# Patient Record
Sex: MALE | Race: WHITE | NOT HISPANIC OR LATINO | Employment: FULL TIME | URBAN - NONMETROPOLITAN AREA
[De-identification: names, ages, dates, MRNs, and addresses within clinical notes are randomized per-mention and may not be internally consistent; named-entity substitution may affect disease eponyms.]

---

## 2018-12-20 ENCOUNTER — OFFICE VISIT (OUTPATIENT)
Dept: MEDICAL GROUP | Facility: CLINIC | Age: 33
End: 2018-12-20

## 2018-12-20 VITALS
HEART RATE: 91 BPM | HEIGHT: 71 IN | WEIGHT: 251 LBS | SYSTOLIC BLOOD PRESSURE: 173 MMHG | DIASTOLIC BLOOD PRESSURE: 111 MMHG | TEMPERATURE: 98.5 F | BODY MASS INDEX: 35.14 KG/M2 | RESPIRATION RATE: 16 BRPM | OXYGEN SATURATION: 96 %

## 2018-12-20 DIAGNOSIS — E66.9 OBESITY (BMI 35.0-39.9 WITHOUT COMORBIDITY): ICD-10-CM

## 2018-12-20 DIAGNOSIS — S05.91XA RIGHT EYE INJURY, INITIAL ENCOUNTER: ICD-10-CM

## 2018-12-20 DIAGNOSIS — I10 ESSENTIAL HYPERTENSION: ICD-10-CM

## 2018-12-20 PROCEDURE — 99202 OFFICE O/P NEW SF 15 MIN: CPT | Performed by: PHYSICIAN ASSISTANT

## 2018-12-20 RX ORDER — ERYTHROMYCIN 5 MG/G
OINTMENT OPHTHALMIC
Qty: 1 TUBE | Refills: 0 | Status: SHIPPED | OUTPATIENT
Start: 2018-12-20 | End: 2019-01-17

## 2018-12-20 RX ORDER — HYDROCHLOROTHIAZIDE 25 MG/1
25 TABLET ORAL DAILY
Qty: 30 TAB | Refills: 3 | Status: SHIPPED | OUTPATIENT
Start: 2018-12-20 | End: 2019-01-17

## 2018-12-20 NOTE — PROGRESS NOTES
"cc:  Eye injury    Subjective:     Diego Hunt is a 33 y.o. male presenting for  Right eye injury    Patient woke up this morning at 5 am with pain in the right eye.  He states that the corner of a blanket caught his right eye.  His eye has been watering. Earlier today the pain was severe but now the pain is mild.  He has tried warm compresses and cold compresses which have not worked.  He denies fever and chills, nausea and vomiting.  He has had cold symptoms for about 4 days and had stiff neck for awhile.      Patient also has a history of pre-hypertension.  Indicates that when he is gone to the grocery store and checked his blood pressure, it has been mildly elevated.  He also acknowledges that he has been ill but his blood pressure today is 173/111.      Review of systems:  See above.  Denies any other symptoms unless previously indicated      Current Outpatient Prescriptions:   •  erythromycin 5 MG/GM Ointment, Apply 1 cm ribbon in right eye up to 6 times a day for 7 to 10 day, Disp: 1 Tube, Rfl: 0  •  hydroCHLOROthiazide (HYDRODIURIL) 25 MG Tab, Take 1 Tab by mouth every day., Disp: 30 Tab, Rfl: 3    Allergies, past medical history, past surgical history, family history, social history reviewed and updated    Objective:     Vitals: BP (!) 173/111 (BP Location: Right arm, Patient Position: Sitting)   Pulse 91   Temp 36.9 °C (98.5 °F)   Resp 16   Ht 1.803 m (5' 11\")   Wt 113.9 kg (251 lb)   SpO2 96%   BMI 35.01 kg/m²    General: Alert, pleasant, NAD  HEENT: Normocephalic.  EOMI, no icterus or pallor.  Conjunctivae of right eye appears injected and inflamed.  Eye is watering.  Eyelashes of right eye are matted.  Lids normal. External ears normal.  Ear canals normal oropharynx non-erythematous, mucous membranes moist.  Neck supple.   No cervical or supraclavicular lymphadenopathy.  Heart: Regular rate and rhythm.  S1 and S2 normal.  No murmurs appreciated.  Respiratory: Normal respiratory effort. Wheezing " in upper and lower left lobe  Skin: Warm, dry, no rashes.  Face appears flushed.  Musculoskeletal: Gait is normal.  Moves all extremities well.  Neuro: Cranial nerves II through XII intact.  No focal deficits noted.  Psych:  Affect/mood is normal, judgement is good, memory is intact, grooming is appropriate.    Assessment/Plan:     Diego was seen today for eye problem.    Diagnoses and all orders for this visit:    Right eye injury, initial encounter  -     erythromycin 5 MG/GM Ointment; Apply 1 cm ribbon in right eye up to 6 times a day for 7 to 10 day    I have advised patient that as this is an eye injury, I do not have any equipment here or the tools necessary to thoroughly evaluate the eye to make sure there is not a foreign object or an ulceration.  I will start him on an antibiotic but I have advised him to go to an urgent care at least urgent care/ER to have his eye more fully evaluated.  Patient has acknowledged and verbalized understanding to these instructions.  Potential issues and side effects with undiagnosed issues could result in blindness and patient is aware of this.    Essential hypertension  -     hydroCHLOROthiazide (HYDRODIURIL) 25 MG Tab; Take 1 Tab by mouth every day.  -     Lipid Profile; Future  -     COMP METABOLIC PANEL; Future  Obesity (BMI 35.0-39.9 without comorbidity)    We will start patient on hydrochlorothiazide 25 mg and obtain labs.  We will follow-up in 1 month for blood pressure check and to discuss lab results.  Discussed improved diet and exercise.    No Follow-up on file.

## 2019-01-09 ENCOUNTER — NON-PROVIDER VISIT (OUTPATIENT)
Dept: MEDICAL GROUP | Facility: CLINIC | Age: 34
End: 2019-01-09
Payer: COMMERCIAL

## 2019-01-09 DIAGNOSIS — I10 ESSENTIAL HYPERTENSION: ICD-10-CM

## 2019-01-09 PROCEDURE — 36415 COLL VENOUS BLD VENIPUNCTURE: CPT | Performed by: INTERNAL MEDICINE

## 2019-01-09 NOTE — NON-PROVIDER
Diego Hunt is a 34 y.o. male here for a non-provider visit for Venipuncture    If abnormal was an in office provider notified upon receipt of results (if so, indicate provider)? Yes  Routed to PCP? Yes

## 2019-01-17 ENCOUNTER — OFFICE VISIT (OUTPATIENT)
Dept: MEDICAL GROUP | Facility: CLINIC | Age: 34
End: 2019-01-17

## 2019-01-17 VITALS
RESPIRATION RATE: 16 BRPM | SYSTOLIC BLOOD PRESSURE: 167 MMHG | HEIGHT: 71 IN | HEART RATE: 90 BPM | DIASTOLIC BLOOD PRESSURE: 104 MMHG | WEIGHT: 245 LBS | TEMPERATURE: 98.7 F | BODY MASS INDEX: 34.3 KG/M2 | OXYGEN SATURATION: 96 %

## 2019-01-17 DIAGNOSIS — E78.49 FAMILIAL HYPERLIPIDEMIA, HIGH LDL: ICD-10-CM

## 2019-01-17 DIAGNOSIS — E66.9 OBESITY (BMI 35.0-39.9 WITHOUT COMORBIDITY): ICD-10-CM

## 2019-01-17 DIAGNOSIS — I10 ESSENTIAL HYPERTENSION: ICD-10-CM

## 2019-01-17 PROBLEM — S05.91XA RIGHT EYE INJURY: Status: RESOLVED | Noted: 2018-12-20 | Resolved: 2019-01-17

## 2019-01-17 PROCEDURE — 99213 OFFICE O/P EST LOW 20 MIN: CPT | Performed by: PHYSICIAN ASSISTANT

## 2019-01-17 RX ORDER — HYDROCHLOROTHIAZIDE 50 MG/1
50 TABLET ORAL DAILY
Qty: 90 TAB | Refills: 1 | Status: SHIPPED | OUTPATIENT
Start: 2019-01-17 | End: 2019-03-20

## 2019-01-17 NOTE — PROGRESS NOTES
"cc:  hypertension    Subjective:     Diego Hunt is a 34 y.o. male presenting follow up blood pressure.        He states that he has been checking his blood pressure daily and states that it has been 135/95.  He states that overall he has been working on maintaining a healthy diet.  However he does notice at night that he is more hungry and will reach for anything he can find to eat including sugars and processed foods where he tries to avoid that most of the day.  He is wanting to know if there is something to be done to help him with the snacking.  Overall he feels that he is improved.  He is no longer having any eye issues.  He is here today to follow-up with his blood pressure and lab work.    Review of systems:  See above.  Denies any other symptoms unless previously indicated.      Current Outpatient Prescriptions:   •  hydroCHLOROthiazide (HYDRODIURIL) 50 MG Tab, Take 1 Tab by mouth every day., Disp: 90 Tab, Rfl: 1    Allergies, past medical history, past surgical history, family history, social history reviewed and updated    Objective:     Vitals: BP (!) 167/104 (BP Location: Left arm, Patient Position: Sitting)   Pulse 90   Temp 37.1 °C (98.7 °F)   Resp 16   Ht 1.803 m (5' 11\")   Wt 111.1 kg (245 lb)   SpO2 96%   BMI 34.17 kg/m²   General: Alert, pleasant, NAD  HEENT: Normocephalic.  EOMI, no icterus or pallor.  Conjunctivae and lids normal. External ears normal. Neck supple.  Heart: Regular rate and rhythm.  S1 and S2 normal.  No murmurs appreciated.  Respiratory: Normal respiratory effort.  Clear to auscultation bilaterally.  Skin: Warm, dry, no rashes.  Musculoskeletal: Gait is normal.  Moves all extremities well.  Neuro: Cranial nerves II through XII intact.  No focal deficits noted.  Psych:  Affect/mood is normal, judgement is good, memory is intact, grooming is appropriate.    Assessment/Plan:     Diego was seen today for follow-up.    Diagnoses and all orders for this visit:    Essential " hypertension  -     hydroCHLOROthiazide (HYDRODIURIL) 50 MG Tab; Take 1 Tab by mouth every day.  Familial hyperlipidemia, high LDL  Obesity (BMI 35.0-39.9 without comorbidity) (HCC)    Increase hydrochlorothiazide to 50 mg.  We will follow-up in 3-4 months.  If no improvement, will need to add medications.  Patient will work on improved diet and exercise.  We did discuss nighttime snacking and recommended trying to add protein instead of going for the fast burning carbs.  Patient indicates that he will give this a try.  We can repeat labs in a year and he will contact us sooner if needed.        Return in about 4 months (around 5/17/2019) for 3-4 months.

## 2019-03-20 ENCOUNTER — OFFICE VISIT (OUTPATIENT)
Dept: MEDICAL GROUP | Facility: CLINIC | Age: 34
End: 2019-03-20

## 2019-03-20 VITALS
BODY MASS INDEX: 35.28 KG/M2 | DIASTOLIC BLOOD PRESSURE: 110 MMHG | SYSTOLIC BLOOD PRESSURE: 162 MMHG | HEART RATE: 90 BPM | OXYGEN SATURATION: 98 % | WEIGHT: 252 LBS | HEIGHT: 71 IN | TEMPERATURE: 98.6 F

## 2019-03-20 DIAGNOSIS — F41.9 ANXIETY: ICD-10-CM

## 2019-03-20 DIAGNOSIS — I10 ESSENTIAL HYPERTENSION: ICD-10-CM

## 2019-03-20 DIAGNOSIS — G47.09 OTHER INSOMNIA: ICD-10-CM

## 2019-03-20 PROCEDURE — 99213 OFFICE O/P EST LOW 20 MIN: CPT | Performed by: PHYSICIAN ASSISTANT

## 2019-03-20 RX ORDER — LISINOPRIL AND HYDROCHLOROTHIAZIDE 12.5; 1 MG/1; MG/1
1 TABLET ORAL DAILY
Qty: 30 TAB | Refills: 6 | Status: SHIPPED | OUTPATIENT
Start: 2019-03-20 | End: 2019-05-01

## 2019-03-20 ASSESSMENT — PATIENT HEALTH QUESTIONNAIRE - PHQ9
CLINICAL INTERPRETATION OF PHQ2 SCORE: 1
5. POOR APPETITE OR OVEREATING: 2 - MORE THAN HALF THE DAYS
SUM OF ALL RESPONSES TO PHQ QUESTIONS 1-9: 9

## 2019-03-20 NOTE — PROGRESS NOTES
"cc:  hypertension    Subjective:     Diego Hunt is a 34 y.o. male presenting for hypertension      Patient presents to office today to follow up with blood pressure. He states that blood pressure was not controlled.  We increased the HCTZ to 50 mg but this was too much for the patient.  He became lightheaded, dehydrated and was urinating more.  He decreased the dose to 25 mg and come in for further evaluation.  He denies fever, chills, nausea and vomiting.  He would look flushed when he would take 50 mg.      He states that he has been having a hard time sleeping.  He has been taking melatonin which has not been working well.  He is having a hard time staying asleep also.  He does have anxiety but is not sure if he snores.  He is not wanting to address this issue or treat this issue at this time but he would like me to be aware of it as he may want to follow-up with that at next visit.    Review of systems:  See above. Denies any other symptoms unless previously indicated.         Current Outpatient Prescriptions:   •  lisinopril-hydrochlorothiazide (PRINZIDE, ZESTORETIC) 10-12.5 MG per tablet, Take 1 Tab by mouth every day., Disp: 30 Tab, Rfl: 6    Allergies, past medical history, past surgical history, family history, social history reviewed and updated    Objective:     Vitals: BP (!) 162/110 (BP Location: Left arm, Patient Position: Sitting)   Pulse 90   Temp 37 °C (98.6 °F) (Temporal)   Ht 1.803 m (5' 11\")   Wt 114.3 kg (252 lb)   SpO2 98%   BMI 35.15 kg/m²   General: Alert, pleasant, NAD  HEENT: Normocephalic.  EOMI, no icterus or pallor.  Conjunctivae and lids normal. External ears normal.  Neck supple.   Heart: Regular rate and rhythm.  S1 and S2 normal.  No murmurs appreciated.  Respiratory: Normal respiratory effort.  Clear to auscultation bilaterally.  Abdomen: Obese  Skin: Warm, dry, no rashes.  Musculoskeletal: Gait is normal.  Moves all extremities well.  Neuro: Cranial nerves II through XII " intact.  No focal deficits noted.  Psych:  Affect/mood is anxious, judgement is good, memory is intact, grooming is appropriate.    Assessment/Plan:     Diego was seen today for hypertension.    Diagnoses and all orders for this visit:    Essential hypertension  -     lisinopril-hydrochlorothiazide (PRINZIDE, ZESTORETIC) 10-12.5 MG per tablet; Take 1 Tab by mouth every day.  We will switch medication to lisinopril hydrochlorothiazide 10/12.5 mg.  We will follow-up in 4 weeks, sooner if needed.  Side effects discussed with patient.  If unable to tolerate medication he is to contact office and we will switch medications.    Anxiety  I believe this is a large driving force behind his blood pressure.  However, he is not wanting to see behavioral health or begin any medication.  We will continue to monitor at this time.    Other insomnia  Concern is for sleep apnea.  We did discuss a sleep study but feels overwhelmed at trying to proceed with a sleep study at this time.  Will discuss at follow-up if he is ready and submit orders if needed.      No Follow-up on file.

## 2019-04-23 ENCOUNTER — OFFICE VISIT (OUTPATIENT)
Dept: MEDICAL GROUP | Facility: CLINIC | Age: 34
End: 2019-04-23

## 2019-04-23 VITALS
BODY MASS INDEX: 35.28 KG/M2 | SYSTOLIC BLOOD PRESSURE: 153 MMHG | HEIGHT: 71 IN | WEIGHT: 252 LBS | DIASTOLIC BLOOD PRESSURE: 96 MMHG | TEMPERATURE: 97.7 F | HEART RATE: 117 BPM | OXYGEN SATURATION: 97 %

## 2019-04-23 DIAGNOSIS — J40 BRONCHITIS: ICD-10-CM

## 2019-04-23 PROCEDURE — 99213 OFFICE O/P EST LOW 20 MIN: CPT | Performed by: PHYSICIAN ASSISTANT

## 2019-04-23 RX ORDER — METHYLPREDNISOLONE 4 MG/1
TABLET ORAL
Qty: 1 KIT | Refills: 0 | Status: SHIPPED | OUTPATIENT
Start: 2019-04-23 | End: 2019-05-01

## 2019-04-23 RX ORDER — ALBUTEROL SULFATE 90 UG/1
2 AEROSOL, METERED RESPIRATORY (INHALATION) EVERY 6 HOURS PRN
Qty: 8.5 G | Refills: 3 | Status: SHIPPED | OUTPATIENT
Start: 2019-04-23 | End: 2019-05-01

## 2019-04-23 NOTE — PROGRESS NOTES
"cc:  Cold symptoms     Subjective:     Diego Hunt is a 34 y.o. male presenting for cold symptoms      Patient states he has been coughing for about 2 weeks and will cause him to dry heave and vomit.  It's worse in the morning or at night.  He denies a history of asthma. This does not occur yearly.  He feels like something is stuck in the trachea.  His family has been ill.  He denies fever and chills.  He denies any other symptoms except for nasal drainage and congestion. It's like a tickle in the back of the throat.      Review of systems:  See above. Denies any other symptoms unless previously indicated.        Current Outpatient Prescriptions:   •  MethylPREDNISolone (MEDROL DOSEPAK) 4 MG Tablet Therapy Pack, Take as directed., Disp: 1 Kit, Rfl: 0  •  albuterol 108 (90 Base) MCG/ACT Aero Soln inhalation aerosol, Inhale 2 Puffs by mouth every 6 hours as needed for Shortness of Breath., Disp: 8.5 g, Rfl: 3  •  lisinopril-hydrochlorothiazide (PRINZIDE, ZESTORETIC) 10-12.5 MG per tablet, Take 1 Tab by mouth every day., Disp: 30 Tab, Rfl: 6    Allergies, past medical history, past surgical history, family history, social history reviewed and updated    Objective:     Vitals: /96 (BP Location: Left arm, Patient Position: Sitting)   Pulse (!) 117   Temp 36.5 °C (97.7 °F) (Temporal)   Ht 1.803 m (5' 11\")   Wt 114.3 kg (252 lb)   SpO2 97%   BMI 35.15 kg/m²   General: Alert, pleasant, NAD  HEENT: Normocephalic.  EOMI, no icterus or pallor.  Conjunctivae and lids normal. External ears normal. Oropharynx non-erythematous, mucous membranes moist.  Neck supple.  No cervical or supraclavicular lymphadenopathy.  Heart: Regular rate and rhythm.  S1 and S2 normal.  No murmurs appreciated.  Respiratory: Normal respiratory effort.  Clear to auscultation bilaterally.  Decreased breath sounds in the lower bases.  Patient sounds hoarse.  Abdomen: obese  Skin: Warm, dry, no rashes.  Musculoskeletal: Gait is normal.  Moves " all extremities well.  Neuro: Cranial nerves II through XII intact.  No focal deficits noted.  Psych:  Affect/mood is normal, judgement is good, memory is intact, grooming is appropriate.    Assessment/Plan:     Diego was seen today for cough.    Diagnoses and all orders for this visit:    Bronchitis  -     MethylPREDNISolone (MEDROL DOSEPAK) 4 MG Tablet Therapy Pack; Take as directed.  -     albuterol 108 (90 Base) MCG/ACT Aero Soln inhalation aerosol; Inhale 2 Puffs by mouth every 6 hours as needed for Shortness of Breath.  Medications as indicated.  No improvement in the next 2 to 3 days, will add an antibiotic.  Follow-up if no improvement within the week.      No Follow-up on file.    Please note that this dictation was created using voice recognition software. I have made every reasonable attempt to correct obvious errors, but expect that there are errors of grammar and possible content that I did not discover before finalizing note.

## 2019-05-01 ENCOUNTER — OFFICE VISIT (OUTPATIENT)
Dept: MEDICAL GROUP | Facility: CLINIC | Age: 34
End: 2019-05-01

## 2019-05-01 VITALS
BODY MASS INDEX: 35.28 KG/M2 | OXYGEN SATURATION: 98 % | SYSTOLIC BLOOD PRESSURE: 151 MMHG | HEART RATE: 74 BPM | HEIGHT: 71 IN | TEMPERATURE: 97.8 F | DIASTOLIC BLOOD PRESSURE: 99 MMHG | WEIGHT: 252 LBS

## 2019-05-01 DIAGNOSIS — R05.9 COUGH: ICD-10-CM

## 2019-05-01 DIAGNOSIS — I10 ESSENTIAL HYPERTENSION: ICD-10-CM

## 2019-05-01 PROBLEM — J40 BRONCHITIS: Status: RESOLVED | Noted: 2019-04-23 | Resolved: 2019-05-01

## 2019-05-01 PROCEDURE — 99213 OFFICE O/P EST LOW 20 MIN: CPT | Performed by: PHYSICIAN ASSISTANT

## 2019-05-01 RX ORDER — LORATADINE 10 MG/1
1 CAPSULE, LIQUID FILLED ORAL DAILY
Qty: 30 CAP | Refills: 5 | Status: SHIPPED | OUTPATIENT
Start: 2019-05-01

## 2019-05-01 RX ORDER — VALSARTAN AND HYDROCHLOROTHIAZIDE 80; 12.5 MG/1; MG/1
1 TABLET, FILM COATED ORAL DAILY
Qty: 30 TAB | Refills: 5 | Status: SHIPPED | OUTPATIENT
Start: 2019-05-01 | End: 2019-12-19 | Stop reason: SDUPTHER

## 2019-05-01 NOTE — PROGRESS NOTES
"cc:  Follow up    Subjective:     Diego Hunt is a 34 y.o. male presenting for follow up bronchitis and hypertension      Patient was seen on 4-23-19 and diagnosed with bronchitis.  At that time he did have an elevated blood pressure reading.  He does take lisinopril hydrochlorothiazide.  He states that he took the medrol dose pack and it did not make much of a difference.  He states that the inhaler does not help also.  He states that the cough is dry.  When he checks his blood pressure in the morning it is usually 130's over 90's.  He feels the tickle does not go away.  He denies any other symptoms including fever, chills and nausea.     Review of systems:  See above. Denies any other symptoms unless previously indicated.      Current Outpatient Prescriptions:   •  Loratadine (CLARITIN) 10 MG Cap, Take 1 Tab by mouth every day., Disp: 30 Cap, Rfl: 5  •  valsartan-hydrochlorothiazide (DIOVAN-HCT) 80-12.5 MG per tablet, Take 1 Tab by mouth every day., Disp: 30 Tab, Rfl: 5    Allergies, past medical history, past surgical history, family history, social history reviewed and updated    Objective:     Vitals: /99 (BP Location: Left arm, Patient Position: Sitting)   Pulse 74   Temp 36.6 °C (97.8 °F) (Temporal)   Ht 1.803 m (5' 11\")   Wt 114.3 kg (252 lb)   SpO2 98%   BMI 35.15 kg/m²   General: Alert, pleasant, NAD  EYES:  PERRL, EOMI, no icterus or pallor.  Conjunctivae and lids normal.   HENT: Normocephalic.  External ears normal.Neck supple.  No thyromegaly or masses palpated.   Heart: Regular rate and rhythm.  S1 and S2 normal.  No murmurs appreciated.  Respiratory: Normal respiratory effort.  Clear to auscultation bilaterally.  Abdomen: obese  Skin: Warm, dry, no rashes.  Musculoskeletal: Gait is normal.  Moves all extremities well.  Neurological: No tremors, gait is normal, CN2-12 intact  Psych:  Affect/mood is normal, judgement is good, memory is intact, grooming is appropriate.    Assessment/Plan: "     Diego was seen today for follow-up and hypertension.    Diagnoses and all orders for this visit:    Cough  -     Loratadine (CLARITIN) 10 MG Cap; Take 1 Tab by mouth every day.  Essential hypertension  -     valsartan-hydrochlorothiazide (DIOVAN-HCT) 80-12.5 MG per tablet; Take 1 Tab by mouth every day.  Cough may be from lisinopril.  We will stop this and start valsartan hydrochlorothiazide.  We will also try Claritin as this may be an allergy induced cough.  Patient would prefer to follow-up in 2 weeks.  Offered chest x-ray but he declined at this time wanting to give medication a chance.  If no improvement at follow-up, will order x-ray.  He will contact us sooner if needed.      Return in about 2 weeks (around 5/15/2019), or if symptoms worsen or fail to improve.    Please note that this dictation was created using voice recognition software. I have made every reasonable attempt to correct obvious errors, but expect that there are errors of grammar and possible content that I did not discover before finalizing note.

## 2019-12-19 DIAGNOSIS — I10 ESSENTIAL HYPERTENSION: ICD-10-CM

## 2019-12-19 RX ORDER — VALSARTAN AND HYDROCHLOROTHIAZIDE 80; 12.5 MG/1; MG/1
1 TABLET, FILM COATED ORAL DAILY
Qty: 30 TAB | Refills: 0 | Status: SHIPPED
Start: 2019-12-19 | End: 2020-01-22

## 2019-12-19 NOTE — TELEPHONE ENCOUNTER
Was the patient seen in the last year in this department? Yes    Does patient have an active prescription for medications requested? Yes    Received Request Via: Pharmacy     Requested Prescriptions     Pending Prescriptions Disp Refills   • valsartan-hydrochlorothiazide (DIOVAN-HCT) 80-12.5 MG per tablet 30 Tab 5     Sig: Take 1 Tab by mouth every day.

## 2020-01-22 ENCOUNTER — OFFICE VISIT (OUTPATIENT)
Dept: MEDICAL GROUP | Facility: CLINIC | Age: 35
End: 2020-01-22
Payer: COMMERCIAL

## 2020-01-22 VITALS
DIASTOLIC BLOOD PRESSURE: 93 MMHG | WEIGHT: 250 LBS | RESPIRATION RATE: 16 BRPM | OXYGEN SATURATION: 97 % | SYSTOLIC BLOOD PRESSURE: 139 MMHG | TEMPERATURE: 98 F | BODY MASS INDEX: 35.79 KG/M2 | HEART RATE: 116 BPM | HEIGHT: 70 IN

## 2020-01-22 DIAGNOSIS — I10 ESSENTIAL HYPERTENSION: ICD-10-CM

## 2020-01-22 DIAGNOSIS — F41.9 ANXIETY: ICD-10-CM

## 2020-01-22 DIAGNOSIS — F42.2 MIXED OBSESSIONAL THOUGHTS AND ACTS: ICD-10-CM

## 2020-01-22 PROCEDURE — 99213 OFFICE O/P EST LOW 20 MIN: CPT | Performed by: PHYSICIAN ASSISTANT

## 2020-01-22 RX ORDER — SERTRALINE HYDROCHLORIDE 25 MG/1
25 TABLET, FILM COATED ORAL DAILY
Qty: 30 TAB | Refills: 5 | Status: SHIPPED
Start: 2020-01-22 | End: 2020-02-12

## 2020-01-22 RX ORDER — VALSARTAN AND HYDROCHLOROTHIAZIDE 160; 12.5 MG/1; MG/1
1 TABLET, FILM COATED ORAL DAILY
Qty: 30 TAB | Refills: 3 | Status: SHIPPED | OUTPATIENT
Start: 2020-01-22 | End: 2020-02-12 | Stop reason: SDUPTHER

## 2020-01-22 NOTE — PROGRESS NOTES
"cc:  hypertension    Subjective:     Diego Hunt is a 35 y.o. male presenting for hypertension      Patient presents to the office for hypertension.  He has been taking his medication on a daily basis and states that he is almost out.  He has been checking his bp at home and has obtained similar readings as to what we have today.     Patient states that he has a history of mild depression and anxiety and states that he feels he has ocd symptoms.  He has had this since the age of 22.  He feels that it is getting worse and has noticed some increased obsessive symptoms.  He states that it is starting to become problematic.  He is having a hard time sleeping.  It has been worse in the last 6 months.  It is making him late to work.   Pateint denies thoughts of hurting self or others.      Review of systems:  See above.   Denies any symptoms unless previously indicated.        Current Outpatient Medications:   •  valsartan-hydrochlorothiazide (DIOVAN-HCT) 160-12.5 MG per tablet, Take 1 Tab by mouth every day., Disp: 30 Tab, Rfl: 3  •  sertraline (ZOLOFT) 25 MG tablet, Take 1 Tab by mouth every day., Disp: 30 Tab, Rfl: 5  •  Loratadine (CLARITIN) 10 MG Cap, Take 1 Tab by mouth every day., Disp: 30 Cap, Rfl: 5    Allergies, past medical history, past surgical history, family history, social history reviewed and updated    Objective:     Vitals: /93 (BP Location: Right arm, Patient Position: Sitting)   Pulse (!) 116   Temp 36.7 °C (98 °F)   Resp 16   Ht 1.778 m (5' 10\")   Wt 113.4 kg (250 lb)   SpO2 97%   BMI 35.87 kg/m²   General: Alert, pleasant, NAD  EYES:   PERRL, EOMI, no icterus or pallor.  Conjunctivae and lids normal.   HENT:  Normocephalic.  External ears normal.  Neck supple.    Abdomen: obese  Skin: Warm, dry, no rashes.  Musculoskeletal: Gait is normal.  Moves all extremities well.    Neurological: No tremors,  gait is normal, CN2-12 intact.  Psych:  Affect/mood is anxious, judgement is good, " memory is intact, grooming is appropriate.    Assessment/Plan:     Diego was seen today for follow-up and hypertension.    Diagnoses and all orders for this visit:    Essential hypertension  -     valsartan-hydrochlorothiazide (DIOVAN-HCT) 160-12.5 MG per tablet; Take 1 Tab by mouth every day.    Blood pressure is still mildly elevated.  We will increase the medication at this time and follow-up in 4 weeks, sooner if needed peer    Anxiety  -     sertraline (ZOLOFT) 25 MG tablet; Take 1 Tab by mouth every day.  Mixed obsessional thoughts and acts  -     sertraline (ZOLOFT) 25 MG tablet; Take 1 Tab by mouth every day.    Symptoms are significantly affecting patient.  Along with some mild depression, he is also experiencing anxiety but the obsessive-compulsive behavior is becoming quite a burden for the patient.  We will start him on sertraline 25 mg and follow-up in 4 weeks.  If there is some slight improvement will increase the dose.  If no improvement with medication and/or paroxetine or fluoxetine is ineffective, will refer to psychiatry.        Return in about 4 weeks (around 2/19/2020).    Please note that this dictation was created using voice recognition software. I have made every reasonable attempt to correct obvious errors, but expect that there are errors of grammar and possible content that I did not discover before finalizing note.

## 2020-02-12 ENCOUNTER — OFFICE VISIT (OUTPATIENT)
Dept: MEDICAL GROUP | Facility: CLINIC | Age: 35
End: 2020-02-12
Payer: COMMERCIAL

## 2020-02-12 VITALS
HEART RATE: 75 BPM | OXYGEN SATURATION: 95 % | WEIGHT: 250 LBS | HEIGHT: 70 IN | SYSTOLIC BLOOD PRESSURE: 130 MMHG | RESPIRATION RATE: 16 BRPM | TEMPERATURE: 98.2 F | BODY MASS INDEX: 35.79 KG/M2 | DIASTOLIC BLOOD PRESSURE: 88 MMHG

## 2020-02-12 DIAGNOSIS — I10 ESSENTIAL HYPERTENSION: ICD-10-CM

## 2020-02-12 DIAGNOSIS — F42.2 MIXED OBSESSIONAL THOUGHTS AND ACTS: ICD-10-CM

## 2020-02-12 PROCEDURE — 99213 OFFICE O/P EST LOW 20 MIN: CPT | Performed by: PHYSICIAN ASSISTANT

## 2020-02-12 RX ORDER — VALSARTAN AND HYDROCHLOROTHIAZIDE 160; 12.5 MG/1; MG/1
1 TABLET, FILM COATED ORAL DAILY
Qty: 30 TAB | Refills: 5 | Status: SHIPPED | OUTPATIENT
Start: 2020-02-12

## 2020-02-12 ASSESSMENT — PATIENT HEALTH QUESTIONNAIRE - PHQ9: CLINICAL INTERPRETATION OF PHQ2 SCORE: 0

## 2020-02-12 NOTE — PROGRESS NOTES
"cc:  Follow up    Subjective:     Diego Hunt is a 35 y.o. male presenting for follow up      Patient presents to the office for follow up.  Blood pressure does look better today.  He is not having issues with it at this time    Patient states that he is still having OCD symptoms.  He states that he did have nausea with the medication about 4-5 days.  However, he did eat with it and it did become better.  He has tolerated medication but did have some dizziness which also improved with eating.  He is still having ocd symptoms.       Review of systems:  See above.   Denies any symptoms unless previously indicated.        Current Outpatient Medications:   •  sertraline (ZOLOFT) 50 MG Tab, Take 1 Tab by mouth every day., Disp: 30 Tab, Rfl: 4  •  valsartan-hydrochlorothiazide (DIOVAN-HCT) 160-12.5 MG per tablet, Take 1 Tab by mouth every day., Disp: 30 Tab, Rfl: 5  •  Loratadine (CLARITIN) 10 MG Cap, Take 1 Tab by mouth every day., Disp: 30 Cap, Rfl: 5    Allergies, past medical history, past surgical history, family history, social history reviewed and updated    Objective:     Vitals: /88 (BP Location: Right arm, Patient Position: Sitting)   Pulse 75   Temp 36.8 °C (98.2 °F)   Resp 16   Ht 1.778 m (5' 10\")   Wt 113.4 kg (250 lb)   SpO2 95%   BMI 35.87 kg/m²   General: Alert, pleasant, NAD  EYES:   PERRL, EOMI, no icterus or pallor.  Conjunctivae and lids normal.   HENT:  Normocephalic.  External ears normal. Tympanic membranes pearly, opaque.  No nasal drainage present.  Neck supple.   Abdomen: obese  Skin: Warm, dry, no rashes.  Musculoskeletal: Gait is normal.  Moves all extremities well.    Neurological: No tremors, sensation grossly intact, CN2-12 intact.  Psych:  Affect/mood is normal, judgement is good, memory is intact, grooming is appropriate.    Assessment/Plan:     Diego was seen today for hypertension and follow-up.    Diagnoses and all orders for this visit:    Mixed obsessional thoughts and " acts  -     sertraline (ZOLOFT) 50 MG Tab; Take 1 Tab by mouth every day.    Patient is still having symptoms.  We will increase the dose at this time.  Goal would be to follow-up with new PCP in approximately a month as [facility] will be leaving [place].    Essential hypertension  -     valsartan-hydrochlorothiazide (DIOVAN-HCT) 160-12.5 MG per tablet; Take 1 Tab by mouth every day.    Improved.  Continue with current medication at this time.  Medication refills given to hold him until he is able to switch primary care providers.        No follow-ups on file.    Please note that this dictation was created using voice recognition software. I have made every reasonable attempt to correct obvious errors, but expect that there are errors of grammar and possible content that I did not discover before finalizing note.